# Patient Record
Sex: FEMALE | Race: WHITE | NOT HISPANIC OR LATINO
[De-identification: names, ages, dates, MRNs, and addresses within clinical notes are randomized per-mention and may not be internally consistent; named-entity substitution may affect disease eponyms.]

---

## 2020-01-20 ENCOUNTER — RESULT REVIEW (OUTPATIENT)
Age: 25
End: 2020-01-20

## 2020-04-10 PROBLEM — Z00.00 ENCOUNTER FOR PREVENTIVE HEALTH EXAMINATION: Status: ACTIVE | Noted: 2020-04-10

## 2020-04-13 ENCOUNTER — ASOB RESULT (OUTPATIENT)
Age: 25
End: 2020-04-13

## 2020-04-13 ENCOUNTER — APPOINTMENT (OUTPATIENT)
Dept: ANTEPARTUM | Facility: CLINIC | Age: 25
End: 2020-04-13
Payer: COMMERCIAL

## 2020-04-13 PROCEDURE — 76811 OB US DETAILED SNGL FETUS: CPT

## 2020-08-25 ENCOUNTER — INPATIENT (INPATIENT)
Facility: HOSPITAL | Age: 25
LOS: 2 days | Discharge: HOME | End: 2020-08-28
Attending: OBSTETRICS & GYNECOLOGY | Admitting: OBSTETRICS & GYNECOLOGY
Payer: COMMERCIAL

## 2020-08-25 DIAGNOSIS — O26.899 OTHER SPECIFIED PREGNANCY RELATED CONDITIONS, UNSPECIFIED TRIMESTER: ICD-10-CM

## 2020-08-25 DIAGNOSIS — Z3A.00 WEEKS OF GESTATION OF PREGNANCY NOT SPECIFIED: ICD-10-CM

## 2020-08-26 ENCOUNTER — TRANSCRIPTION ENCOUNTER (OUTPATIENT)
Age: 25
End: 2020-08-26

## 2020-08-26 VITALS — WEIGHT: 166.01 LBS | HEIGHT: 63 IN

## 2020-08-26 DIAGNOSIS — Z34.80 ENCOUNTER FOR SUPERVISION OF OTHER NORMAL PREGNANCY, UNSPECIFIED TRIMESTER: ICD-10-CM

## 2020-08-26 LAB
ALBUMIN SERPL ELPH-MCNC: 3 G/DL — LOW (ref 3.3–5)
ALP SERPL-CCNC: 68 U/L — SIGNIFICANT CHANGE UP (ref 40–120)
ALT FLD-CCNC: 10 U/L — SIGNIFICANT CHANGE UP (ref 10–45)
ANION GAP SERPL CALC-SCNC: 10 MMOL/L — SIGNIFICANT CHANGE UP (ref 5–17)
APTT BLD: 26.9 SEC — LOW (ref 27.5–35.5)
AST SERPL-CCNC: 21 U/L — SIGNIFICANT CHANGE UP (ref 10–40)
BASOPHILS # BLD AUTO: 0 K/UL — SIGNIFICANT CHANGE UP (ref 0–0.2)
BASOPHILS # BLD AUTO: 0.02 K/UL — SIGNIFICANT CHANGE UP (ref 0–0.2)
BASOPHILS # BLD AUTO: 0.03 K/UL — SIGNIFICANT CHANGE UP (ref 0–0.2)
BASOPHILS NFR BLD AUTO: 0 % — SIGNIFICANT CHANGE UP (ref 0–2)
BASOPHILS NFR BLD AUTO: 0.1 % — SIGNIFICANT CHANGE UP (ref 0–2)
BASOPHILS NFR BLD AUTO: 0.3 % — SIGNIFICANT CHANGE UP (ref 0–2)
BILIRUB SERPL-MCNC: 0.3 MG/DL — SIGNIFICANT CHANGE UP (ref 0.2–1.2)
BLD GP AB SCN SERPL QL: NEGATIVE — SIGNIFICANT CHANGE UP
BUN SERPL-MCNC: <4 MG/DL — LOW (ref 7–23)
CALCIUM SERPL-MCNC: 8.7 MG/DL — SIGNIFICANT CHANGE UP (ref 8.4–10.5)
CHLORIDE SERPL-SCNC: 105 MMOL/L — SIGNIFICANT CHANGE UP (ref 96–108)
CO2 SERPL-SCNC: 21 MMOL/L — LOW (ref 22–31)
CREAT SERPL-MCNC: 0.53 MG/DL — SIGNIFICANT CHANGE UP (ref 0.5–1.3)
EOSINOPHIL # BLD AUTO: 0 K/UL — SIGNIFICANT CHANGE UP (ref 0–0.5)
EOSINOPHIL # BLD AUTO: 0.02 K/UL — SIGNIFICANT CHANGE UP (ref 0–0.5)
EOSINOPHIL # BLD AUTO: 0.09 K/UL — SIGNIFICANT CHANGE UP (ref 0–0.5)
EOSINOPHIL NFR BLD AUTO: 0 % — SIGNIFICANT CHANGE UP (ref 0–6)
EOSINOPHIL NFR BLD AUTO: 0.1 % — SIGNIFICANT CHANGE UP (ref 0–6)
EOSINOPHIL NFR BLD AUTO: 0.8 % — SIGNIFICANT CHANGE UP (ref 0–6)
FIBRINOGEN PPP-MCNC: 419 MG/DL — SIGNIFICANT CHANGE UP (ref 350–510)
GLUCOSE SERPL-MCNC: 119 MG/DL — HIGH (ref 70–99)
HCT VFR BLD CALC: 30.4 % — LOW (ref 34.5–45)
HCT VFR BLD CALC: 31.7 % — LOW (ref 34.5–45)
HCT VFR BLD CALC: 40.2 % — SIGNIFICANT CHANGE UP (ref 34.5–45)
HGB BLD-MCNC: 10 G/DL — LOW (ref 11.5–15.5)
HGB BLD-MCNC: 10.1 G/DL — LOW (ref 11.5–15.5)
HGB BLD-MCNC: 13.1 G/DL — SIGNIFICANT CHANGE UP (ref 11.5–15.5)
IMM GRANULOCYTES NFR BLD AUTO: 0.5 % — SIGNIFICANT CHANGE UP (ref 0–1.5)
IMM GRANULOCYTES NFR BLD AUTO: 0.6 % — SIGNIFICANT CHANGE UP (ref 0–1.5)
INR BLD: 0.95 RATIO — SIGNIFICANT CHANGE UP (ref 0.88–1.16)
LACTATE SERPL-SCNC: 1.5 MMOL/L — SIGNIFICANT CHANGE UP (ref 0.7–2)
LACTATE SERPL-SCNC: 2.9 MMOL/L — HIGH (ref 0.7–2)
LACTATE SERPL-SCNC: 3 MMOL/L — HIGH (ref 0.7–2)
LDH SERPL L TO P-CCNC: 156 U/L — SIGNIFICANT CHANGE UP (ref 50–242)
LYMPHOCYTES # BLD AUTO: 1.06 K/UL — SIGNIFICANT CHANGE UP (ref 1–3.3)
LYMPHOCYTES # BLD AUTO: 1.91 K/UL — SIGNIFICANT CHANGE UP (ref 1–3.3)
LYMPHOCYTES # BLD AUTO: 11.3 % — LOW (ref 13–44)
LYMPHOCYTES # BLD AUTO: 28.7 % — SIGNIFICANT CHANGE UP (ref 13–44)
LYMPHOCYTES # BLD AUTO: 3.21 K/UL — SIGNIFICANT CHANGE UP (ref 1–3.3)
LYMPHOCYTES # BLD AUTO: 6.1 % — LOW (ref 13–44)
MCHC RBC-ENTMCNC: 29.6 PG — SIGNIFICANT CHANGE UP (ref 27–34)
MCHC RBC-ENTMCNC: 29.6 PG — SIGNIFICANT CHANGE UP (ref 27–34)
MCHC RBC-ENTMCNC: 29.9 PG — SIGNIFICANT CHANGE UP (ref 27–34)
MCHC RBC-ENTMCNC: 31.9 GM/DL — LOW (ref 32–36)
MCHC RBC-ENTMCNC: 32.6 GM/DL — SIGNIFICANT CHANGE UP (ref 32–36)
MCHC RBC-ENTMCNC: 32.9 GM/DL — SIGNIFICANT CHANGE UP (ref 32–36)
MCV RBC AUTO: 90.7 FL — SIGNIFICANT CHANGE UP (ref 80–100)
MCV RBC AUTO: 91 FL — SIGNIFICANT CHANGE UP (ref 80–100)
MCV RBC AUTO: 93 FL — SIGNIFICANT CHANGE UP (ref 80–100)
MONOCYTES # BLD AUTO: 0.45 K/UL — SIGNIFICANT CHANGE UP (ref 0–0.9)
MONOCYTES # BLD AUTO: 0.63 K/UL — SIGNIFICANT CHANGE UP (ref 0–0.9)
MONOCYTES # BLD AUTO: 0.64 K/UL — SIGNIFICANT CHANGE UP (ref 0–0.9)
MONOCYTES NFR BLD AUTO: 2.6 % — SIGNIFICANT CHANGE UP (ref 2–14)
MONOCYTES NFR BLD AUTO: 3.8 % — SIGNIFICANT CHANGE UP (ref 2–14)
MONOCYTES NFR BLD AUTO: 5.6 % — SIGNIFICANT CHANGE UP (ref 2–14)
NEUTROPHILS # BLD AUTO: 14.16 K/UL — HIGH (ref 1.8–7.4)
NEUTROPHILS # BLD AUTO: 15.8 K/UL — HIGH (ref 1.8–7.4)
NEUTROPHILS # BLD AUTO: 7.16 K/UL — SIGNIFICANT CHANGE UP (ref 1.8–7.4)
NEUTROPHILS NFR BLD AUTO: 64 % — SIGNIFICANT CHANGE UP (ref 43–77)
NEUTROPHILS NFR BLD AUTO: 84.2 % — HIGH (ref 43–77)
NEUTROPHILS NFR BLD AUTO: 90.4 % — HIGH (ref 43–77)
NRBC # BLD: 0 /100 WBCS — SIGNIFICANT CHANGE UP (ref 0–0)
NRBC # BLD: 0 /100 WBCS — SIGNIFICANT CHANGE UP (ref 0–0)
PLATELET # BLD AUTO: 183 K/UL — SIGNIFICANT CHANGE UP (ref 150–400)
PLATELET # BLD AUTO: 188 K/UL — SIGNIFICANT CHANGE UP (ref 150–400)
PLATELET # BLD AUTO: 242 K/UL — SIGNIFICANT CHANGE UP (ref 150–400)
POTASSIUM SERPL-MCNC: 3.5 MMOL/L — SIGNIFICANT CHANGE UP (ref 3.5–5.3)
POTASSIUM SERPL-SCNC: 3.5 MMOL/L — SIGNIFICANT CHANGE UP (ref 3.5–5.3)
PROT SERPL-MCNC: 5 G/DL — LOW (ref 6–8.3)
PROTHROM AB SERPL-ACNC: 11.3 SEC — SIGNIFICANT CHANGE UP (ref 10.6–13.6)
RBC # BLD: 3.34 M/UL — LOW (ref 3.8–5.2)
RBC # BLD: 3.41 M/UL — LOW (ref 3.8–5.2)
RBC # BLD: 4.43 M/UL — SIGNIFICANT CHANGE UP (ref 3.8–5.2)
RBC # FLD: 13.1 % — SIGNIFICANT CHANGE UP (ref 10.3–14.5)
RBC # FLD: 13.2 % — SIGNIFICANT CHANGE UP (ref 10.3–14.5)
RBC # FLD: 13.3 % — SIGNIFICANT CHANGE UP (ref 10.3–14.5)
RH IG SCN BLD-IMP: POSITIVE — SIGNIFICANT CHANGE UP
RH IG SCN BLD-IMP: POSITIVE — SIGNIFICANT CHANGE UP
SARS-COV-2 IGG SERPL QL IA: POSITIVE
SARS-COV-2 IGM SERPL IA-ACNC: 10.2 INDEX — HIGH
SARS-COV-2 RNA SPEC QL NAA+PROBE: SIGNIFICANT CHANGE UP
SODIUM SERPL-SCNC: 136 MMOL/L — SIGNIFICANT CHANGE UP (ref 135–145)
T PALLIDUM AB TITR SER: NEGATIVE — SIGNIFICANT CHANGE UP
URATE SERPL-MCNC: 2.8 MG/DL — SIGNIFICANT CHANGE UP (ref 2.5–7)
WBC # BLD: 11.19 K/UL — HIGH (ref 3.8–10.5)
WBC # BLD: 16.84 K/UL — HIGH (ref 3.8–10.5)
WBC # BLD: 17.31 K/UL — HIGH (ref 3.8–10.5)
WBC # FLD AUTO: 11.19 K/UL — HIGH (ref 3.8–10.5)
WBC # FLD AUTO: 16.84 K/UL — HIGH (ref 3.8–10.5)
WBC # FLD AUTO: 17.31 K/UL — HIGH (ref 3.8–10.5)

## 2020-08-26 PROCEDURE — 88307 TISSUE EXAM BY PATHOLOGIST: CPT | Mod: 26

## 2020-08-26 RX ORDER — SODIUM CHLORIDE 9 MG/ML
3 INJECTION INTRAMUSCULAR; INTRAVENOUS; SUBCUTANEOUS EVERY 8 HOURS
Refills: 0 | Status: DISCONTINUED | OUTPATIENT
Start: 2020-08-26 | End: 2020-08-28

## 2020-08-26 RX ORDER — PRAMOXINE HYDROCHLORIDE 150 MG/15G
1 AEROSOL, FOAM RECTAL EVERY 4 HOURS
Refills: 0 | Status: DISCONTINUED | OUTPATIENT
Start: 2020-08-26 | End: 2020-08-28

## 2020-08-26 RX ORDER — MAGNESIUM HYDROXIDE 400 MG/1
30 TABLET, CHEWABLE ORAL
Refills: 0 | Status: DISCONTINUED | OUTPATIENT
Start: 2020-08-26 | End: 2020-08-28

## 2020-08-26 RX ORDER — SIMETHICONE 80 MG/1
80 TABLET, CHEWABLE ORAL EVERY 4 HOURS
Refills: 0 | Status: DISCONTINUED | OUTPATIENT
Start: 2020-08-26 | End: 2020-08-28

## 2020-08-26 RX ORDER — OXYTOCIN 10 UNIT/ML
4 VIAL (ML) INJECTION
Qty: 30 | Refills: 0 | Status: DISCONTINUED | OUTPATIENT
Start: 2020-08-26 | End: 2020-08-28

## 2020-08-26 RX ORDER — OXYCODONE HYDROCHLORIDE 5 MG/1
5 TABLET ORAL ONCE
Refills: 0 | Status: DISCONTINUED | OUTPATIENT
Start: 2020-08-26 | End: 2020-08-28

## 2020-08-26 RX ORDER — IBUPROFEN 200 MG
600 TABLET ORAL EVERY 6 HOURS
Refills: 0 | Status: DISCONTINUED | OUTPATIENT
Start: 2020-08-26 | End: 2020-08-28

## 2020-08-26 RX ORDER — LANOLIN
1 OINTMENT (GRAM) TOPICAL EVERY 6 HOURS
Refills: 0 | Status: DISCONTINUED | OUTPATIENT
Start: 2020-08-26 | End: 2020-08-28

## 2020-08-26 RX ORDER — OXYCODONE HYDROCHLORIDE 5 MG/1
5 TABLET ORAL
Refills: 0 | Status: DISCONTINUED | OUTPATIENT
Start: 2020-08-26 | End: 2020-08-28

## 2020-08-26 RX ORDER — PIPERACILLIN AND TAZOBACTAM 4; .5 G/20ML; G/20ML
3.38 INJECTION, POWDER, LYOPHILIZED, FOR SOLUTION INTRAVENOUS EVERY 6 HOURS
Refills: 0 | Status: DISCONTINUED | OUTPATIENT
Start: 2020-08-26 | End: 2020-08-27

## 2020-08-26 RX ORDER — PIPERACILLIN AND TAZOBACTAM 4; .5 G/20ML; G/20ML
3.38 INJECTION, POWDER, LYOPHILIZED, FOR SOLUTION INTRAVENOUS ONCE
Refills: 0 | Status: COMPLETED | OUTPATIENT
Start: 2020-08-26 | End: 2020-08-26

## 2020-08-26 RX ORDER — CITRIC ACID/SODIUM CITRATE 300-500 MG
15 SOLUTION, ORAL ORAL EVERY 6 HOURS
Refills: 0 | Status: DISCONTINUED | OUTPATIENT
Start: 2020-08-26 | End: 2020-08-26

## 2020-08-26 RX ORDER — ACETAMINOPHEN 500 MG
975 TABLET ORAL
Refills: 0 | Status: DISCONTINUED | OUTPATIENT
Start: 2020-08-26 | End: 2020-08-28

## 2020-08-26 RX ORDER — DIBUCAINE 1 %
1 OINTMENT (GRAM) RECTAL EVERY 6 HOURS
Refills: 0 | Status: DISCONTINUED | OUTPATIENT
Start: 2020-08-26 | End: 2020-08-28

## 2020-08-26 RX ORDER — IBUPROFEN 200 MG
600 TABLET ORAL EVERY 6 HOURS
Refills: 0 | Status: COMPLETED | OUTPATIENT
Start: 2020-08-26 | End: 2021-07-25

## 2020-08-26 RX ORDER — HYDROCORTISONE 1 %
1 OINTMENT (GRAM) TOPICAL EVERY 6 HOURS
Refills: 0 | Status: DISCONTINUED | OUTPATIENT
Start: 2020-08-26 | End: 2020-08-28

## 2020-08-26 RX ORDER — OXYTOCIN 10 UNIT/ML
4 VIAL (ML) INJECTION
Qty: 30 | Refills: 0 | Status: DISCONTINUED | OUTPATIENT
Start: 2020-08-26 | End: 2020-08-26

## 2020-08-26 RX ORDER — AER TRAVELER 0.5 G/1
1 SOLUTION RECTAL; TOPICAL EVERY 4 HOURS
Refills: 0 | Status: DISCONTINUED | OUTPATIENT
Start: 2020-08-26 | End: 2020-08-28

## 2020-08-26 RX ORDER — BENZOCAINE 10 %
1 GEL (GRAM) MUCOUS MEMBRANE EVERY 6 HOURS
Refills: 0 | Status: DISCONTINUED | OUTPATIENT
Start: 2020-08-26 | End: 2020-08-28

## 2020-08-26 RX ORDER — SODIUM CHLORIDE 9 MG/ML
1000 INJECTION, SOLUTION INTRAVENOUS
Refills: 0 | Status: DISCONTINUED | OUTPATIENT
Start: 2020-08-26 | End: 2020-08-28

## 2020-08-26 RX ORDER — IBUPROFEN 200 MG
1 TABLET ORAL
Qty: 0 | Refills: 0 | DISCHARGE
Start: 2020-08-26

## 2020-08-26 RX ORDER — SODIUM CHLORIDE 9 MG/ML
1000 INJECTION, SOLUTION INTRAVENOUS
Refills: 0 | Status: DISCONTINUED | OUTPATIENT
Start: 2020-08-26 | End: 2020-08-26

## 2020-08-26 RX ORDER — OXYTOCIN 10 UNIT/ML
333.33 VIAL (ML) INJECTION
Qty: 20 | Refills: 0 | Status: DISCONTINUED | OUTPATIENT
Start: 2020-08-26 | End: 2020-08-28

## 2020-08-26 RX ORDER — TETANUS TOXOID, REDUCED DIPHTHERIA TOXOID AND ACELLULAR PERTUSSIS VACCINE, ADSORBED 5; 2.5; 8; 8; 2.5 [IU]/.5ML; [IU]/.5ML; UG/.5ML; UG/.5ML; UG/.5ML
0.5 SUSPENSION INTRAMUSCULAR ONCE
Refills: 0 | Status: DISCONTINUED | OUTPATIENT
Start: 2020-08-26 | End: 2020-08-28

## 2020-08-26 RX ORDER — DIPHENHYDRAMINE HCL 50 MG
25 CAPSULE ORAL EVERY 6 HOURS
Refills: 0 | Status: DISCONTINUED | OUTPATIENT
Start: 2020-08-26 | End: 2020-08-28

## 2020-08-26 RX ORDER — SODIUM CHLORIDE 9 MG/ML
1000 INJECTION, SOLUTION INTRAVENOUS ONCE
Refills: 0 | Status: COMPLETED | OUTPATIENT
Start: 2020-08-26 | End: 2020-08-26

## 2020-08-26 RX ORDER — KETOROLAC TROMETHAMINE 30 MG/ML
30 SYRINGE (ML) INJECTION ONCE
Refills: 0 | Status: DISCONTINUED | OUTPATIENT
Start: 2020-08-26 | End: 2020-08-26

## 2020-08-26 RX ORDER — ACETAMINOPHEN 500 MG
3 TABLET ORAL
Qty: 0 | Refills: 0 | DISCHARGE
Start: 2020-08-26

## 2020-08-26 RX ADMIN — Medication 975 MILLIGRAM(S): at 16:15

## 2020-08-26 RX ADMIN — Medication 600 MILLIGRAM(S): at 20:37

## 2020-08-26 RX ADMIN — PIPERACILLIN AND TAZOBACTAM 200 GRAM(S): 4; .5 INJECTION, POWDER, LYOPHILIZED, FOR SOLUTION INTRAVENOUS at 14:27

## 2020-08-26 RX ADMIN — Medication 975 MILLIGRAM(S): at 15:36

## 2020-08-26 RX ADMIN — SODIUM CHLORIDE 1000 MILLILITER(S): 9 INJECTION, SOLUTION INTRAVENOUS at 12:54

## 2020-08-26 RX ADMIN — PIPERACILLIN AND TAZOBACTAM 25 GRAM(S): 4; .5 INJECTION, POWDER, LYOPHILIZED, FOR SOLUTION INTRAVENOUS at 20:00

## 2020-08-26 RX ADMIN — Medication 30 MILLIGRAM(S): at 11:24

## 2020-08-26 RX ADMIN — Medication 30 MILLIGRAM(S): at 13:36

## 2020-08-26 RX ADMIN — Medication 600 MILLIGRAM(S): at 21:07

## 2020-08-26 RX ADMIN — Medication 4 MILLIUNIT(S)/MIN: at 03:06

## 2020-08-26 NOTE — DISCHARGE NOTE OB - PATIENT PORTAL LINK FT
You can access the FollowMyHealth Patient Portal offered by [facility] by registering at the following website: [email or web address]. By joining [employer]’s FollowMyHealth portal, you will also be able to view your health information using other applications (apps) compatible with our system.

## 2020-08-26 NOTE — PRE-ANESTHESIA EVALUATION ADULT - NSANTHOSAYNRD_GEN_A_CORE
No. TAI screening performed.  STOP BANG Legend: 0-2 = LOW Risk; 3-4 = INTERMEDIATE Risk; 5-8 = HIGH Risk

## 2020-08-26 NOTE — DISCHARGE NOTE OB - CARE PROVIDER_API CALL
Amina Hi  OBSTETRICS AND GYNECOLOGY  [address], [address]  [place] [address]  Phone: [phone]  Fax: [phone]  Follow Up Time:

## 2020-08-26 NOTE — CHART NOTE - NSCHARTNOTEFT_GEN_A_CORE
[facility] Event Note     S: Patient seen and evaluated at the bedside for tachycardia to 120s-130s. Patient complains of feeling warm and sweaty. Pain is well controlled. Tolerating regular diet.   Patient unable to void, was straight cath'ed for urinary retention with 1L outptut.      Is passing flatus.    Denies chest pain, cough, shortness of breath, nausea, vomiting.  Denies back pain, dysuria, hematuria, flank pain and urinary frequency.   Denies lower extremity pain and swelling.     Labor course: patient came in overnight at 4cm dilated, had epidural and uncomplicated vaginal delivery with . Patient afebrile throughout with labile blood pressures. Fetal tachycardia noted.    O:    T(C): 36.9 (20 @ 09:10), Max: 36.9 (20 @ 09:10)  HR: 128 (20 @ 11:10) (116 - 136)  BP: 117/62 (20 @ 11:10) (102/55 - 117/62)  RR: 18 (20 @ 11:10) (18 - 20)  SpO2: 96% (20 @ 11:10) (96% - 100%)      20 @ 07:01  -  20 @ 07:00  --------------------------------------------------------  IN: 3000 mL / OUT: 700 mL / NET: 2300 mL    20 @ 07:01  -  20 @ 12:38  --------------------------------------------------------  IN: 3000 mL / OUT: 1850 mL / NET: 1150 mL        Gen: NAD, AAOx3  CV: RRR, no murmurs  Pulm: CTABL  Back: no CVAT  Abd: soft, ND, NT/appropriate post op tenderness, incision clean, dry and intact, no rebound tenderness or guarding  Extr: SCDs in place, NTBL, no edema or erythema    Medications:  acetaminophen   Tablet .. 975 milliGRAM(s) Oral <User Schedule>  benzocaine 20%/menthol 0.5% Spray 1 Spray(s) Topical every 6 hours PRN  citric acid/sodium citrate Solution 15 milliLiter(s) Oral every 6 hours  dextrose 5% + lactated ringers. 1000 milliLiter(s) IV Continuous <Continuous>  dibucaine 1% Ointment 1 Application(s) Topical every 6 hours PRN  diphenhydrAMINE 25 milliGRAM(s) Oral every 6 hours PRN  diphtheria/tetanus/pertussis (acellular) Vaccine (ADAcel) 0.5 milliLiter(s) IntraMuscular once  hydrocortisone 1% Cream 1 Application(s) Topical every 6 hours PRN  ibuprofen  Tablet. 600 milliGRAM(s) Oral every 6 hours  lactated ringers Bolus 1000 milliLiter(s) IV Bolus once  lactated ringers. 1000 milliLiter(s) IV Continuous <Continuous>  lanolin Ointment 1 Application(s) Topical every 6 hours PRN  magnesium hydroxide Suspension 30 milliLiter(s) Oral two times a day PRN  oxyCODONE    IR 5 milliGRAM(s) Oral every 3 hours PRN  oxyCODONE    IR 5 milliGRAM(s) Oral once PRN  oxytocin Infusion 333.333 milliUNIT(s)/Min IV Continuous <Continuous>  oxytocin Infusion 333.333 milliUNIT(s)/Min IV Continuous <Continuous>  oxytocin Infusion 4 milliUNIT(s)/Min IV Continuous <Continuous>  oxytocin Infusion 4 milliUNIT(s)/Min IV Continuous <Continuous>  pramoxine 1%/zinc 5% Cream 1 Application(s) Topical every 4 hours PRN  prenatal multivitamin 1 Tablet(s) Oral daily  simethicone 80 milliGRAM(s) Chew every 4 hours PRN  sodium chloride 0.9% lock flush 3 milliLiter(s) IV Push every 8 hours  witch hazel Pads 1 Application(s) Topical every 4 hours PRN      A/P: 24y PPD#0, status post  with post op tachycardia. Differential diagnosis includes atelectatis, pneumonia, UTI,  endometritis, wound infection, DVT, PE, drugs, septic pelvic thrombophlebitis. Hignest on the differential is fluid underresuscitaiton v infection.  -CBC, lactate, CMP, LDH, uric acid, coags  -Urine culture, blood culture x 2 for sepsis workup (fetal and maternal tachycardia)  -continue with IVF--1L bolus ordered  -strict Is and Os    Keyona Chilel MD PGY3  Pager #[phone]  d/w Dr. Hi

## 2020-08-26 NOTE — DISCHARGE NOTE OB - MATERIALS PROVIDED
Breastfeeding Guide and Packet/Discharge Medication Information for Patients and Families Pocket Guide/[place] Hearing Screen Program/Shaken Baby Prevention Handout/Breastfeeding Log/Back To Sleep Handout/Birth Certificate Instructions/Bottle Feeding Log/Breastfeeding Mother’s Support Group Information/Guide to Postpartum Care

## 2020-08-26 NOTE — CHART NOTE - NSCHARTNOTEFT_GEN_A_CORE
PA Chart Note:    Patient is a  24y  PPD#0 s/p  with an EBL of 400cc.  Notified by RN that patient has been tachycardic while in PACU. Her pulse was 136 and then trended down to 110's.   Patient seen and evaluated at bedside. Patient is w/o complaints. She denies CP, palpitations, SOB. Pain is controlled. Lochia WNL.   To note, patient was tachycardic on arrival to L&D and during labor up to 150.    O:  Vital Signs Last 24 Hrs  T(C): 36.9 (26 Aug 2020 09:10), Max: 36.9 (26 Aug 2020 09:10)  T(F): 98.4 (26 Aug 2020 09:10), Max: 98.4 (26 Aug 2020 09:10)  HR: 116 (26 Aug 2020 10:10) (116 - 136)  BP: 109/74 (26 Aug 2020 10:10) (102/55 - 110/56)  BP(mean): --  RR: 18 (26 Aug 2020 10:10) (18 - 20)  SpO2: 98% (26 Aug 2020 10:10) (98% - 100%)     Gen: NAD  CV: RR, Mildly tachycardic  Abdomen: Soft, nontender, non-distended, fundus firm  Lochia WNL  Ext: Neg edema, Neg calf tenderness    A/P:  24y PPD#0 s/p  with tachycardia. Patient is afebrile, pain is controlled and bleeding WNL.     -Continue to monitor VS  -Continue routine post partum pain mgmt  -For re-eval in 1 hour    D/w Dr. Kolton Hart PA-C

## 2020-08-26 NOTE — PATIENT PROFILE OB - AMNIOTIC FLUID ODOR, LABOR
If you have an active [facility] account, please look for your well child questionnaire to come to your [facility] account before your next well child visit.    [facility]: [phone]    Well-Child Checkup: 6 to 10 Years     Struggles in school can indicate problems with a childs health or development. If your child is having trouble in school, talk to the childs doctor.     Even if your child is healthy, keep bringing him or her in for yearly checkups. These visits ensure your childs health is protected with scheduled vaccinations and health screenings. Your child's healthcare provider will also check his or her growth and development. This sheet describes some of what you can expect.  School and social issues  Here are some topics you, your child, and the healthcare provider may want to discuss during this visit:  · Reading. Does your child like to read? Is the child reading at the right level for his or her age group?   · Friendships. Does your child have friends at school? How do they get along? Do you like your childs friends? Do you have any concerns about your childs friendships or problems that may be happening with other children (such as bullying)?  · Activities. What does your child like to do for fun? Is he or she involved in after-school activities such as sports, scouting, or music classes?   · Family interaction. How are things at home? Does your child have good relationships with others in the family? Does he or she talk to you about problems? How is the childs behavior at home?   · Behavior and participation at school. How does your child act at school? Does the child follow the classroom routine and take part in group activities? What do teachers say about the childs behavior? Is homework finished on time? Do you or other family members help with homework?  · Household chores. Does your child help around the house with chores such as taking out the trash or setting the  table?  Nutrition and exercise tips  Teaching your child healthy eating and lifestyle habits can lead to a lifetime of good health. To help, set a good example with your words and actions. Remember, good habits formed now will stay with your child forever. Here are some tips:  · Help your child get at least 30 minutes to 60 minutes of active play per day. Moving around helps keep your child healthy. Go to the park, ride bikes, or play active games like tag or ball.  · Limit screen time to  a maximum of 1 hour to 2 hours each day. This includes time spent watching TV, playing video games, using the computer, and texting. If your child has a TV, computer, or video game console in the bedroom,  replace it with a music player. For many kids, dancing and singing are fun ways to get moving.  · Limit sugary drinks. Soda, juice, and sports drinks lead to unhealthy weight gain and tooth decay. Water and low-fat or nonfat milk are best to drink. In moderation (a small glass no more than once a day), 100% fruit juice is OK. Save soda and other sugary drinks for special occasions.   · Serve nutritious foods. Keep a variety of healthy foods on hand for snacks, including fresh fruits and vegetables, lean meats, and whole grains. Foods like French fries, candy, and snack foods should only be served rarely.   · Serve child-sized portions. Children dont need as much food as adults. Serve your child portions that make sense for his or her age and size. Let your child stop eating when he or she is full. If your child is still hungry after a meal, offer more vegetables or fruit.  · Ask the healthcare provider about your childs weight. Your child should gain about 4 pounds to 5 pounds each year. If your child is gaining more than that, talk to the health care provider about healthy eating habits and exercise guidelines.  · Bring your child to the dentist at least twice a year for teeth cleaning and a checkup.  Sleeping tips  Now  that your child is in school, a good nights sleep is even more important. At this age, your child needs about 10 hours of sleep each night. Here are some tips:  · Set a bedtime and make sure your child follows it each night.  · TV, computer, and video games can agitate a child and make it hard to calm down for the night. Turn them off at least an hour before bed. Instead, read a chapter of a book together.  · Remind your child to brush and floss his or her teeth before bed. Directly supervise your child's dental self-care to ensure that both the back teeth and the front teeth are cleaned.  Safety tips  · When riding a bike, your child should wear a helmet with the strap fastened. While roller-skating, roller-blading, or using a scooter or skateboard, its safest to wear wrist guards, elbow pads, and knee pads, as well as a helmet.  · In the car, continue to use a booster seat until your child is taller than 4 feet 9 inches. At this height, kids are able to sit with the seat belt fitting correctly over the collarbone and hips. Ask the healthcare provider if you have questions about when your child will be ready to stop using a booster seat. All children younger than 13 should sit in the back seat.  · Teach your child not to talk to strangers or go anywhere with a stranger.  · Teach your child to swim. Many communities offer low-cost swimming lessons. Do not let your child play in or around a pool unattended, even if he or she knows how to swim.  Vaccinations  Based on recommendations from the CDC, at this visit your child may receive the following vaccinations:  · Diphtheria, tetanus, and pertussis (age 6 only)  · Human papillomavirus (HPV) (ages 9 and up)  · Influenza (flu), annually  · Measles, mumps, and rubella  · Polio  · Varicella (chickenpox)  Bedwetting: Its not your childs fault  Bedwetting, or urinating when sleeping, can be frustrating for both you and your child. But its usually not a sign of a major  problem. Your childs body may simply need more time to mature. If a child suddenly starts wetting the bed, the cause is often a lifestyle change (such as starting school) or a stressful event (such as the birth of a sibling). But whatever the cause, its not in your childs direct control. If your child wets the bed:  · Keep in mind that your child is not wetting on purpose. Never punish or tease a child for wetting the bed. Punishment or shaming may make the problem worse, not better.  · To help your child, be positive and supportive. Praise your child for not wetting and even for trying hard to stay dry.  · Two hours before bedtime, dont serve your child anything to drink.  · Remind your child to use the toilet before bed. You could also wake him or her to use the bathroom before you go to bed yourself.  · Have a routine for changing sheets and pajamas when the child wets. Try to make this routine as calm and orderly as possible. This will help keep both you and your child from getting too upset or frustrated to go back to sleep.  · Put up a calendar or chart and give your child a star or sticker for nights that he or she doesnt wet the bed.  · Encourage your child to get out of bed and try to use the toilet if he or she wakes during the night. Put night-lights in the bedroom, hallway, and bathroom to help your child feel safer walking to the bathroom.  · If you have concerns about bedwetting, discuss them with the health care provider.       Next checkup at: _______________________________     PARENT NOTES:  Date Last Reviewed: 10/2/2014  © 1874-5816 [employer]. [address] [place] [address], [place] [address]. All rights reserved. This information is not intended as a substitute for professional medical care. Always follow your healthcare professional's instructions.      Mental Health Resources    Kid Catch Directory: www.kidcatchdirectory.org   This website contains mental health resources for  children and adolescents in the [facility].  Mental health providers are searchable by issue and insurance.    [facility] Providers    [facility] - outpatient counseling, psychiatry, educational services  [email or web address]://[email or web address]    [address] [place] [address]  [phone]    [address] [place] [address]  [phone]    [address] [place] [address]  [phone]    [facility] - psychiatry, therapy, counseling  [email or web address]    [address]  [place] [address]  [phone]    [address] [place] [address]  [phone]    [facility] - psychiatry, therapy, counseling  http://[email or web address].Perficient    [address], [address]  [place], [place] [address] [phone]    [facility] - therapy, counseling, evaluations (psychiatric, psychoeducational, school entrance)  [email or web address]    [address] [place] [address]  [phone]    [facility] - mental health, counseling    [address], [address]  [place] [address]  [phone]    [address] [place] [address]  [phone]    [facility] - psychiatry, therapy, counseling  [email or web address]    [address]  [place], [place] [address]  [phone]    [address]  [place], [place] [address]   [phone]    [facility] - ADHD, anxiety, depression, PTSD  [email or web address]  [address] [place] [address]  [phone]    [facility] - individual, group, family, and play therapy  [email or web address]    [address] [place] [address], [address]  [place] [address]  [phone]    [facility]  - evaluations: ADHD, developmental, psychoeducational, and neuropsychological  [email or web address]    [address], [place] [address]  [place]  [address]   [phone]   normal

## 2020-08-26 NOTE — DISCHARGE NOTE OB - CARE PLAN
Principal Discharge DX:	 (normal spontaneous vaginal delivery)  Goal:	recovery  Assessment and plan of treatment:	return to baseline health, regular diet, pain control, follow up with Dr Hi

## 2020-08-27 ENCOUNTER — APPOINTMENT (OUTPATIENT)
Dept: ANTEPARTUM | Facility: CLINIC | Age: 25
End: 2020-08-27

## 2020-08-27 LAB
BASOPHILS # BLD AUTO: 0.03 K/UL — SIGNIFICANT CHANGE UP (ref 0–0.2)
BASOPHILS NFR BLD AUTO: 0.2 % — SIGNIFICANT CHANGE UP (ref 0–2)
CULTURE RESULTS: SIGNIFICANT CHANGE UP
EOSINOPHIL # BLD AUTO: 0.08 K/UL — SIGNIFICANT CHANGE UP (ref 0–0.5)
EOSINOPHIL NFR BLD AUTO: 0.6 % — SIGNIFICANT CHANGE UP (ref 0–6)
HCT VFR BLD CALC: 28.5 % — LOW (ref 34.5–45)
HGB BLD-MCNC: 9.1 G/DL — LOW (ref 11.5–15.5)
IMM GRANULOCYTES NFR BLD AUTO: 0.3 % — SIGNIFICANT CHANGE UP (ref 0–1.5)
LYMPHOCYTES # BLD AUTO: 18.6 % — SIGNIFICANT CHANGE UP (ref 13–44)
LYMPHOCYTES # BLD AUTO: 2.49 K/UL — SIGNIFICANT CHANGE UP (ref 1–3.3)
MCHC RBC-ENTMCNC: 29.6 PG — SIGNIFICANT CHANGE UP (ref 27–34)
MCHC RBC-ENTMCNC: 31.9 GM/DL — LOW (ref 32–36)
MCV RBC AUTO: 92.8 FL — SIGNIFICANT CHANGE UP (ref 80–100)
MONOCYTES # BLD AUTO: 0.71 K/UL — SIGNIFICANT CHANGE UP (ref 0–0.9)
MONOCYTES NFR BLD AUTO: 5.3 % — SIGNIFICANT CHANGE UP (ref 2–14)
NEUTROPHILS # BLD AUTO: 10.07 K/UL — HIGH (ref 1.8–7.4)
NEUTROPHILS NFR BLD AUTO: 75 % — SIGNIFICANT CHANGE UP (ref 43–77)
NRBC # BLD: 0 /100 WBCS — SIGNIFICANT CHANGE UP (ref 0–0)
PLATELET # BLD AUTO: 179 K/UL — SIGNIFICANT CHANGE UP (ref 150–400)
RBC # BLD: 3.07 M/UL — LOW (ref 3.8–5.2)
RBC # FLD: 13.3 % — SIGNIFICANT CHANGE UP (ref 10.3–14.5)
SPECIMEN SOURCE: SIGNIFICANT CHANGE UP
WBC # BLD: 13.42 K/UL — HIGH (ref 3.8–10.5)
WBC # FLD AUTO: 13.42 K/UL — HIGH (ref 3.8–10.5)

## 2020-08-27 RX ORDER — PIPERACILLIN AND TAZOBACTAM 4; .5 G/20ML; G/20ML
3.38 INJECTION, POWDER, LYOPHILIZED, FOR SOLUTION INTRAVENOUS EVERY 8 HOURS
Refills: 0 | Status: DISCONTINUED | OUTPATIENT
Start: 2020-08-27 | End: 2020-08-28

## 2020-08-27 RX ADMIN — PIPERACILLIN AND TAZOBACTAM 25 GRAM(S): 4; .5 INJECTION, POWDER, LYOPHILIZED, FOR SOLUTION INTRAVENOUS at 03:27

## 2020-08-27 RX ADMIN — SODIUM CHLORIDE 3 MILLILITER(S): 9 INJECTION INTRAMUSCULAR; INTRAVENOUS; SUBCUTANEOUS at 21:26

## 2020-08-27 RX ADMIN — Medication 600 MILLIGRAM(S): at 09:44

## 2020-08-27 RX ADMIN — Medication 600 MILLIGRAM(S): at 20:38

## 2020-08-27 RX ADMIN — Medication 600 MILLIGRAM(S): at 15:10

## 2020-08-27 RX ADMIN — Medication 975 MILLIGRAM(S): at 04:45

## 2020-08-27 RX ADMIN — Medication 600 MILLIGRAM(S): at 21:25

## 2020-08-27 RX ADMIN — PIPERACILLIN AND TAZOBACTAM 25 GRAM(S): 4; .5 INJECTION, POWDER, LYOPHILIZED, FOR SOLUTION INTRAVENOUS at 17:20

## 2020-08-27 RX ADMIN — Medication 975 MILLIGRAM(S): at 05:15

## 2020-08-27 RX ADMIN — Medication 600 MILLIGRAM(S): at 03:47

## 2020-08-27 RX ADMIN — SODIUM CHLORIDE 3 MILLILITER(S): 9 INJECTION INTRAMUSCULAR; INTRAVENOUS; SUBCUTANEOUS at 14:11

## 2020-08-27 RX ADMIN — Medication 975 MILLIGRAM(S): at 13:05

## 2020-08-27 RX ADMIN — Medication 975 MILLIGRAM(S): at 18:05

## 2020-08-27 RX ADMIN — Medication 975 MILLIGRAM(S): at 18:40

## 2020-08-27 RX ADMIN — Medication 975 MILLIGRAM(S): at 12:31

## 2020-08-27 RX ADMIN — Medication 600 MILLIGRAM(S): at 10:20

## 2020-08-27 RX ADMIN — Medication 600 MILLIGRAM(S): at 15:40

## 2020-08-27 RX ADMIN — PIPERACILLIN AND TAZOBACTAM 25 GRAM(S): 4; .5 INJECTION, POWDER, LYOPHILIZED, FOR SOLUTION INTRAVENOUS at 09:44

## 2020-08-27 RX ADMIN — Medication 600 MILLIGRAM(S): at 03:17

## 2020-08-27 RX ADMIN — Medication 975 MILLIGRAM(S): at 23:39

## 2020-08-28 VITALS
OXYGEN SATURATION: 98 % | SYSTOLIC BLOOD PRESSURE: 111 MMHG | DIASTOLIC BLOOD PRESSURE: 78 MMHG | HEART RATE: 67 BPM | TEMPERATURE: 98 F | RESPIRATION RATE: 18 BRPM

## 2020-08-28 PROCEDURE — 87040 BLOOD CULTURE FOR BACTERIA: CPT

## 2020-08-28 PROCEDURE — 83615 LACTATE (LD) (LDH) ENZYME: CPT

## 2020-08-28 PROCEDURE — 88307 TISSUE EXAM BY PATHOLOGIST: CPT

## 2020-08-28 PROCEDURE — 59025 FETAL NON-STRESS TEST: CPT

## 2020-08-28 PROCEDURE — 86769 SARS-COV-2 COVID-19 ANTIBODY: CPT

## 2020-08-28 PROCEDURE — 84550 ASSAY OF BLOOD/URIC ACID: CPT

## 2020-08-28 PROCEDURE — 86780 TREPONEMA PALLIDUM: CPT

## 2020-08-28 PROCEDURE — 85384 FIBRINOGEN ACTIVITY: CPT

## 2020-08-28 PROCEDURE — 86900 BLOOD TYPING SEROLOGIC ABO: CPT

## 2020-08-28 PROCEDURE — 59050 FETAL MONITOR W/REPORT: CPT

## 2020-08-28 PROCEDURE — 83605 ASSAY OF LACTIC ACID: CPT

## 2020-08-28 PROCEDURE — 85610 PROTHROMBIN TIME: CPT

## 2020-08-28 PROCEDURE — 85730 THROMBOPLASTIN TIME PARTIAL: CPT

## 2020-08-28 PROCEDURE — 86901 BLOOD TYPING SEROLOGIC RH(D): CPT

## 2020-08-28 PROCEDURE — 86850 RBC ANTIBODY SCREEN: CPT

## 2020-08-28 PROCEDURE — 85027 COMPLETE CBC AUTOMATED: CPT

## 2020-08-28 PROCEDURE — 80053 COMPREHEN METABOLIC PANEL: CPT

## 2020-08-28 PROCEDURE — G0463: CPT

## 2020-08-28 PROCEDURE — 87086 URINE CULTURE/COLONY COUNT: CPT

## 2020-08-28 RX ADMIN — Medication 600 MILLIGRAM(S): at 09:25

## 2020-08-28 RX ADMIN — Medication 975 MILLIGRAM(S): at 06:10

## 2020-08-28 RX ADMIN — PIPERACILLIN AND TAZOBACTAM 25 GRAM(S): 4; .5 INJECTION, POWDER, LYOPHILIZED, FOR SOLUTION INTRAVENOUS at 01:59

## 2020-08-28 RX ADMIN — Medication 600 MILLIGRAM(S): at 01:59

## 2020-08-28 RX ADMIN — Medication 600 MILLIGRAM(S): at 08:53

## 2020-08-28 RX ADMIN — Medication 975 MILLIGRAM(S): at 12:13

## 2020-08-28 RX ADMIN — Medication 975 MILLIGRAM(S): at 12:45

## 2020-08-28 RX ADMIN — SODIUM CHLORIDE 3 MILLILITER(S): 9 INJECTION INTRAMUSCULAR; INTRAVENOUS; SUBCUTANEOUS at 06:09

## 2020-08-28 RX ADMIN — Medication 1 TABLET(S): at 12:14

## 2020-08-28 NOTE — PROGRESS NOTE ADULT - SUBJECTIVE AND OBJECTIVE BOX
Patient seen and examined at bedside, no acute overnight events. Pt c/o feeling bloated. Pain well controlled. Patient is ambulating, voiding spontaneously, passing gas, and tolerating regular diet. Denies CP, SOB, N/V, HA, blurred vision, epigastric pain.    Vital Signs Last 24 Hours  T(C): 36.8 (08-28-20 @ 05:33), Max: 36.8 (08-28-20 @ 05:33)  HR: 65 (08-28-20 @ 05:33) (65 - 88)  BP: 123/83 (08-28-20 @ 05:33) (110/71 - 123/83)  RR: 17 (08-28-20 @ 05:33) (17 - 18)  SpO2: 98% (08-28-20 @ 05:33) (97% - 99%)    Physical Exam:  General: NAD  Abdomen: Soft, non-tender, non-distended, fundus firm  Pelvic: Lochia wnl    Labs:    Blood Type: A Positive  Antibody Screen: Negative  RPR: Negative               9.1    13.42 )-----------( 179      ( 08-26 @ 22:46 )             28.5                10.1   16.84 )-----------( 188      ( 08-26 @ 17:55 )             31.7                10.0   17.31 )-----------( 183      ( 08-26 @ 13:23 )             30.4         MEDICATIONS  (STANDING):  acetaminophen   Tablet .. 975 milliGRAM(s) Oral <User Schedule>  diphtheria/tetanus/pertussis (acellular) Vaccine (ADAcel) 0.5 milliLiter(s) IntraMuscular once  ibuprofen  Tablet. 600 milliGRAM(s) Oral every 6 hours  lactated ringers. 1000 milliLiter(s) (125 mL/Hr) IV Continuous <Continuous>  oxytocin Infusion 333.333 milliUNIT(s)/Min (1000 mL/Hr) IV Continuous <Continuous>  oxytocin Infusion 333.333 milliUNIT(s)/Min (1000 mL/Hr) IV Continuous <Continuous>  oxytocin Infusion 4 milliUNIT(s)/Min (4 mL/Hr) IV Continuous <Continuous>  oxytocin Infusion 4 milliUNIT(s)/Min (4 mL/Hr) IV Continuous <Continuous>  piperacillin/tazobactam IVPB.. 3.375 Gram(s) IV Intermittent every 8 hours  prenatal multivitamin 1 Tablet(s) Oral daily  sodium chloride 0.9% lock flush 3 milliLiter(s) IV Push every 8 hours    MEDICATIONS  (PRN):  benzocaine 20%/menthol 0.5% Spray 1 Spray(s) Topical every 6 hours PRN for Perineal discomfort  dibucaine 1% Ointment 1 Application(s) Topical every 6 hours PRN Perineal discomfort  diphenhydrAMINE 25 milliGRAM(s) Oral every 6 hours PRN Pruritus  hydrocortisone 1% Cream 1 Application(s) Topical every 6 hours PRN Moderate Pain (4-6)  lanolin Ointment 1 Application(s) Topical every 6 hours PRN nipple soreness  magnesium hydroxide Suspension 30 milliLiter(s) Oral two times a day PRN Constipation  oxyCODONE    IR 5 milliGRAM(s) Oral every 3 hours PRN Moderate to Severe Pain (4-10)  oxyCODONE    IR 5 milliGRAM(s) Oral once PRN Moderate to Severe Pain (4-10)  pramoxine 1%/zinc 5% Cream 1 Application(s) Topical every 4 hours PRN Moderate Pain (4-6)  simethicone 80 milliGRAM(s) Chew every 4 hours PRN Gas  witch hazel Pads 1 Application(s) Topical every 4 hours PRN Perineal discomfort
Patient seen and examined at bedside, no acute overnight events. Pt complains of nausea. Pain well controlled. Patient is ambulating, voiding spontaneously, passing gas, and tolerating regular diet. Denies CP, SOB, HA, blurred vision, epigastric pain.    Vital Signs Last 24 Hours  T(C): 36.7 (08-27-20 @ 04:49), Max: 37.6 (08-26-20 @ 12:55)  HR: 94 (08-27-20 @ 04:49) (68 - 136)  BP: 126/82 (08-27-20 @ 04:49) (102/55 - 131/63)  RR: 18 (08-27-20 @ 04:49) (18 - 20)  SpO2: 100% (08-27-20 @ 04:49) (96% - 100%)    Physical Exam:  General: NAD  Abdomen: Soft, non-tender, non-distended, fundus firm, mildly deviated to the right  Pelvic: Lochia wnl    Labs:    Blood Type: A Positive  Antibody Screen: Negative  RPR: Negative               9.1    13.42 )-----------( 179      ( 08-26 @ 22:46 )             28.5                10.1   16.84 )-----------( 188      ( 08-26 @ 17:55 )             31.7                10.0   17.31 )-----------( 183      ( 08-26 @ 13:23 )             30.4         MEDICATIONS  (STANDING):  acetaminophen   Tablet .. 975 milliGRAM(s) Oral <User Schedule>  diphtheria/tetanus/pertussis (acellular) Vaccine (ADAcel) 0.5 milliLiter(s) IntraMuscular once  ibuprofen  Tablet. 600 milliGRAM(s) Oral every 6 hours  lactated ringers. 1000 milliLiter(s) (125 mL/Hr) IV Continuous <Continuous>  oxytocin Infusion 333.333 milliUNIT(s)/Min (1000 mL/Hr) IV Continuous <Continuous>  oxytocin Infusion 333.333 milliUNIT(s)/Min (1000 mL/Hr) IV Continuous <Continuous>  oxytocin Infusion 4 milliUNIT(s)/Min (4 mL/Hr) IV Continuous <Continuous>  oxytocin Infusion 4 milliUNIT(s)/Min (4 mL/Hr) IV Continuous <Continuous>  piperacillin/tazobactam IVPB.. 3.375 Gram(s) IV Intermittent every 6 hours  prenatal multivitamin 1 Tablet(s) Oral daily  sodium chloride 0.9% lock flush 3 milliLiter(s) IV Push every 8 hours    MEDICATIONS  (PRN):  benzocaine 20%/menthol 0.5% Spray 1 Spray(s) Topical every 6 hours PRN for Perineal discomfort  dibucaine 1% Ointment 1 Application(s) Topical every 6 hours PRN Perineal discomfort  diphenhydrAMINE 25 milliGRAM(s) Oral every 6 hours PRN Pruritus  hydrocortisone 1% Cream 1 Application(s) Topical every 6 hours PRN Moderate Pain (4-6)  lanolin Ointment 1 Application(s) Topical every 6 hours PRN nipple soreness  magnesium hydroxide Suspension 30 milliLiter(s) Oral two times a day PRN Constipation  oxyCODONE    IR 5 milliGRAM(s) Oral every 3 hours PRN Moderate to Severe Pain (4-10)  oxyCODONE    IR 5 milliGRAM(s) Oral once PRN Moderate to Severe Pain (4-10)  pramoxine 1%/zinc 5% Cream 1 Application(s) Topical every 4 hours PRN Moderate Pain (4-6)  simethicone 80 milliGRAM(s) Chew every 4 hours PRN Gas  witch hazel Pads 1 Application(s) Topical every 4 hours PRN Perineal discomfort

## 2020-08-28 NOTE — PROGRESS NOTE ADULT - ASSESSMENT
Pt is a 23yo  PPD1 from  in stable condition.
Pt is a 23yo PPD1 from  c/b postpartum tachycardia now resolved.

## 2020-08-28 NOTE — PROGRESS NOTE ADULT - PROBLEM SELECTOR PLAN 1
- F/u placenta, blood, & urine cultures  - Continue with po analgesia  - Increase ambulation  - Continue regular diet  - IV lock  - No labs    Cezar Nassar, PGY1
- Continue with po analgesia  - Increase ambulation  - Continue regular diet  - IV lock  - No labs    Cezar Nassar, PGY1

## 2020-08-31 LAB
CULTURE RESULTS: SIGNIFICANT CHANGE UP
CULTURE RESULTS: SIGNIFICANT CHANGE UP
SPECIMEN SOURCE: SIGNIFICANT CHANGE UP
SPECIMEN SOURCE: SIGNIFICANT CHANGE UP

## 2020-09-08 ENCOUNTER — EMERGENCY (EMERGENCY)
Facility: HOSPITAL | Age: 25
LOS: 1 days | Discharge: HOME | End: 2020-09-08
Attending: EMERGENCY MEDICINE | Admitting: EMERGENCY MEDICINE
Payer: COMMERCIAL

## 2020-09-08 VITALS
OXYGEN SATURATION: 95 % | SYSTOLIC BLOOD PRESSURE: 120 MMHG | WEIGHT: 119.93 LBS | RESPIRATION RATE: 18 BRPM | HEART RATE: 102 BPM | HEIGHT: 63 IN | TEMPERATURE: 99 F | DIASTOLIC BLOOD PRESSURE: 83 MMHG

## 2020-09-08 VITALS
DIASTOLIC BLOOD PRESSURE: 75 MMHG | SYSTOLIC BLOOD PRESSURE: 118 MMHG | TEMPERATURE: 98 F | RESPIRATION RATE: 18 BRPM | HEART RATE: 88 BPM | OXYGEN SATURATION: 98 %

## 2020-09-08 LAB
ALBUMIN SERPL ELPH-MCNC: 4.3 G/DL — SIGNIFICANT CHANGE UP (ref 3.3–5)
ALP SERPL-CCNC: 81 U/L — SIGNIFICANT CHANGE UP (ref 40–120)
ALT FLD-CCNC: 12 U/L — SIGNIFICANT CHANGE UP (ref 10–45)
ANION GAP SERPL CALC-SCNC: 12 MMOL/L — SIGNIFICANT CHANGE UP (ref 5–17)
APPEARANCE UR: CLEAR — SIGNIFICANT CHANGE UP
APTT BLD: 33.1 SEC — SIGNIFICANT CHANGE UP (ref 27.5–35.5)
AST SERPL-CCNC: 17 U/L — SIGNIFICANT CHANGE UP (ref 10–40)
BASOPHILS # BLD AUTO: 0.05 K/UL — SIGNIFICANT CHANGE UP (ref 0–0.2)
BASOPHILS NFR BLD AUTO: 0.5 % — SIGNIFICANT CHANGE UP (ref 0–2)
BILIRUB SERPL-MCNC: 0.2 MG/DL — SIGNIFICANT CHANGE UP (ref 0.2–1.2)
BILIRUB UR-MCNC: NEGATIVE — SIGNIFICANT CHANGE UP
BUN SERPL-MCNC: 11 MG/DL — SIGNIFICANT CHANGE UP (ref 7–23)
CALCIUM SERPL-MCNC: 10.1 MG/DL — SIGNIFICANT CHANGE UP (ref 8.4–10.5)
CHLORIDE SERPL-SCNC: 104 MMOL/L — SIGNIFICANT CHANGE UP (ref 96–108)
CO2 SERPL-SCNC: 25 MMOL/L — SIGNIFICANT CHANGE UP (ref 22–31)
COLOR SPEC: YELLOW — SIGNIFICANT CHANGE UP
CREAT SERPL-MCNC: 0.68 MG/DL — SIGNIFICANT CHANGE UP (ref 0.5–1.3)
DIFF PNL FLD: ABNORMAL
EOSINOPHIL # BLD AUTO: 0.33 K/UL — SIGNIFICANT CHANGE UP (ref 0–0.5)
EOSINOPHIL NFR BLD AUTO: 3.5 % — SIGNIFICANT CHANGE UP (ref 0–6)
GLUCOSE SERPL-MCNC: 102 MG/DL — HIGH (ref 70–99)
GLUCOSE UR QL: NEGATIVE — SIGNIFICANT CHANGE UP
HCT VFR BLD CALC: 40.8 % — SIGNIFICANT CHANGE UP (ref 34.5–45)
HGB BLD-MCNC: 12.9 G/DL — SIGNIFICANT CHANGE UP (ref 11.5–15.5)
IMM GRANULOCYTES NFR BLD AUTO: 0.9 % — SIGNIFICANT CHANGE UP (ref 0–1.5)
INR BLD: 0.97 — SIGNIFICANT CHANGE UP (ref 0.88–1.16)
KETONES UR-MCNC: NEGATIVE — SIGNIFICANT CHANGE UP
LEUKOCYTE ESTERASE UR-ACNC: ABNORMAL
LYMPHOCYTES # BLD AUTO: 3.13 K/UL — SIGNIFICANT CHANGE UP (ref 1–3.3)
LYMPHOCYTES # BLD AUTO: 32.8 % — SIGNIFICANT CHANGE UP (ref 13–44)
MCHC RBC-ENTMCNC: 29.2 PG — SIGNIFICANT CHANGE UP (ref 27–34)
MCHC RBC-ENTMCNC: 31.6 GM/DL — LOW (ref 32–36)
MCV RBC AUTO: 92.3 FL — SIGNIFICANT CHANGE UP (ref 80–100)
MONOCYTES # BLD AUTO: 0.38 K/UL — SIGNIFICANT CHANGE UP (ref 0–0.9)
MONOCYTES NFR BLD AUTO: 4 % — SIGNIFICANT CHANGE UP (ref 2–14)
NEUTROPHILS # BLD AUTO: 5.57 K/UL — SIGNIFICANT CHANGE UP (ref 1.8–7.4)
NEUTROPHILS NFR BLD AUTO: 58.3 % — SIGNIFICANT CHANGE UP (ref 43–77)
NITRITE UR-MCNC: NEGATIVE — SIGNIFICANT CHANGE UP
NRBC # BLD: 0 /100 WBCS — SIGNIFICANT CHANGE UP (ref 0–0)
PH UR: 7 — SIGNIFICANT CHANGE UP (ref 5–8)
PLATELET # BLD AUTO: 508 K/UL — HIGH (ref 150–400)
POTASSIUM SERPL-MCNC: 4.3 MMOL/L — SIGNIFICANT CHANGE UP (ref 3.5–5.3)
POTASSIUM SERPL-SCNC: 4.3 MMOL/L — SIGNIFICANT CHANGE UP (ref 3.5–5.3)
PROT SERPL-MCNC: 8 G/DL — SIGNIFICANT CHANGE UP (ref 6–8.3)
PROT UR-MCNC: NEGATIVE MG/DL — SIGNIFICANT CHANGE UP
PROTHROM AB SERPL-ACNC: 11.7 SEC — SIGNIFICANT CHANGE UP (ref 10.6–13.6)
RBC # BLD: 4.42 M/UL — SIGNIFICANT CHANGE UP (ref 3.8–5.2)
RBC # FLD: 12.9 % — SIGNIFICANT CHANGE UP (ref 10.3–14.5)
SODIUM SERPL-SCNC: 141 MMOL/L — SIGNIFICANT CHANGE UP (ref 135–145)
SP GR SPEC: <=1.005 — SIGNIFICANT CHANGE UP (ref 1–1.03)
UROBILINOGEN FLD QL: 0.2 E.U./DL — SIGNIFICANT CHANGE UP
WBC # BLD: 9.55 K/UL — SIGNIFICANT CHANGE UP (ref 3.8–10.5)
WBC # FLD AUTO: 9.55 K/UL — SIGNIFICANT CHANGE UP (ref 3.8–10.5)

## 2020-09-08 PROCEDURE — 85610 PROTHROMBIN TIME: CPT

## 2020-09-08 PROCEDURE — 36415 COLL VENOUS BLD VENIPUNCTURE: CPT

## 2020-09-08 PROCEDURE — 80053 COMPREHEN METABOLIC PANEL: CPT

## 2020-09-08 PROCEDURE — 85025 COMPLETE CBC W/AUTO DIFF WBC: CPT

## 2020-09-08 PROCEDURE — 99283 EMERGENCY DEPT VISIT LOW MDM: CPT

## 2020-09-08 PROCEDURE — 85730 THROMBOPLASTIN TIME PARTIAL: CPT

## 2020-09-08 PROCEDURE — 81001 URINALYSIS AUTO W/SCOPE: CPT

## 2020-09-08 PROCEDURE — 87086 URINE CULTURE/COLONY COUNT: CPT

## 2020-09-08 PROCEDURE — 99284 EMERGENCY DEPT VISIT MOD MDM: CPT

## 2020-09-08 NOTE — ED PROVIDER NOTE - CLINICAL SUMMARY MEDICAL DECISION MAKING FREE TEXT BOX
23 y/o F presents w/ crusting over nipples. No longer breast feeding. Currently on Augmentin, WBC normal. Will obtain gyn consult for further recommendations.

## 2020-09-08 NOTE — CONSULT NOTE ADULT - ASSESSMENT
25yo  PPD12 with bilateral nipple crusting, most likely impetigo. No evidence of systemic infection. Vitals stable, CBC WNL.   - Complete course of Augmentin  - Follow up with OB, Dr. Mendoza, within the week  - Continue to use breast pump until follow up visit  - Return for fevers greater than 100.4, severe breast pain    Plan discussed with Dr. Sifuentes

## 2020-09-08 NOTE — ED PROVIDER NOTE - PATIENT PORTAL LINK FT
You can access the FollowMyHealth Patient Portal offered by [facility] by registering at the following website: [email or web address]. By joining [employer]’s FollowMyHealth portal, you will also be able to view your health information using other applications (apps) compatible with our system.

## 2020-09-08 NOTE — ED PROVIDER NOTE - OBJECTIVE STATEMENT
25 y/o otherwise healthy F s/p normal spontaneous vaginal delivery x12 days ago now presents to the ED c/o b/l crusting and pain to nipples w/ some associated white drainage to breasts. Was referred to ED after showing pictures to gyn. Has been on Augmentin for past wk w/ improving chills and breast pain. No longer breast feeding is pumping. No other complaints.

## 2020-09-08 NOTE — ED PROVIDER NOTE - PHYSICAL EXAMINATION
CONSTITUTIONAL: Awake, alert and in no apparent distress.  HEENT: Head is atraumatic. Eyes clear bilaterally, normal EOMI. Airway patent.  CARDIAC: Normal rate, regular rhythm.  Heart sounds S1, S2.   RESPIRATORY: Breath sounds clear and equal bilaterally. no tachypnea, respiratory distress.   GASTROINTESTINAL: Abdomen soft, non-tender, no guarding, distension.  MUSCULOSKELETAL: Spine appears normal, no midline spinal tenderness, range of motion is not limited, no muscle or joint tenderness. no bony tenderness. no tenderness or redness to breasts, no fluctuance, yellow crusting to nipples b/l.   NEUROLOGICAL: Alert, no focal deficits, no motor or sensory deficits.  SKIN: Skin normal color for race, warm, dry and intact. No evidence of rash.  PSYCHIATRIC: Normal mood and affect.

## 2020-09-08 NOTE — CONSULT NOTE ADULT - SUBJECTIVE AND OBJECTIVE BOX
23yo  PPD 12 after  at [facility] c/b chorio, requiring two days of IV abx. Pt states that during the past week she has noticed bilateral nipple skin crusting with some white thick discharge. Pt initially noticed subjective fevers (99.4 at home) and chills. Pt called her OB, Dr. Mendoza, who prescribed her a 10 day course of Augmentin. Pt states that since then the subjective fevers and chills have resolved and the discharge has improved. Pt has also stopped breastfeeding and has been pumping. Patient sent a picture of her nipples to Dr. Mendoza, who sent her to be seen here today. Pt also took two doses of diflucan for mild vaginal itching. Pt denies that the baby has any oral sxs.     OB Hx:  G1: , term, c/b chorio   GYN: no hx abnormal pap smears, STIs  PMH: denies  Meds: denies  PSH: denies  Allergies: NKDA    PHYSICAL EXAM:   Vital Signs Last 24 Hrs  T(C): 37.3 (08 Sep 2020 16:28), Max: 37.3 (08 Sep 2020 16:28)  T(F): 99.2 (08 Sep 2020 16:28), Max: 99.2 (08 Sep 2020 16:28)  HR: 102 (08 Sep 2020 16:28) (102 - 102)  BP: 120/83 (08 Sep 2020 16:28) (120/83 - 120/83)  BP(mean): --  RR: 18 (08 Sep 2020 16:28) (18 - 18)  SpO2: 95% (08 Sep 2020 16:28) (95% - 95%)    **************************  Constitutional: Alert & Oriented x3, No acute distresses  Gastrointestinal: soft, non tender, no rebound or guarding   Breast: bilateral nipples with yellowish crusting, could not express any discharge. Nontender to palpation, no erythema, warmth, masses.   Pelvic exam: deferred   Extremities: no calf tenderness or swelling    LABS:                        12.9   9.55  )-----------( 508      ( 08 Sep 2020 16:44 )             40.8     09-08    141  |  104  |  11  ----------------------------<  102<H>  4.3   |  25  |  0.68    Ca    10.1      08 Sep 2020 16:44    TPro  8.0  /  Alb  4.3  /  TBili  0.2  /  DBili  x   /  AST  17  /  ALT  12  /  AlkPhos  81  09-08    PT/INR - ( 08 Sep 2020 16:44 )   PT: 11.7 sec;   INR: 0.97          PTT - ( 08 Sep 2020 16:44 )  PTT:33.1 sec  Urinalysis Basic - ( 08 Sep 2020 17:41 )    Color: Yellow / Appearance: Clear / SG: <=1.005 / pH: x  Gluc: x / Ketone: NEGATIVE  / Bili: Negative / Urobili: 0.2 E.U./dL   Blood: x / Protein: NEGATIVE mg/dL / Nitrite: NEGATIVE   Leuk Esterase: Moderate / RBC: 5-10 /HPF / WBC > 10 /HPF   Sq Epi: x / Non Sq Epi: Many /HPF / Bacteria: Present /HPF

## 2020-09-09 LAB
CULTURE RESULTS: SIGNIFICANT CHANGE UP
SPECIMEN SOURCE: SIGNIFICANT CHANGE UP
SURGICAL PATHOLOGY STUDY: SIGNIFICANT CHANGE UP

## 2020-09-14 DIAGNOSIS — N64.4 MASTODYNIA: ICD-10-CM

## 2020-09-14 DIAGNOSIS — L01.00 IMPETIGO, UNSPECIFIED: ICD-10-CM

## 2021-09-09 ENCOUNTER — ASOB RESULT (OUTPATIENT)
Age: 26
End: 2021-09-09

## 2021-09-09 ENCOUNTER — APPOINTMENT (OUTPATIENT)
Dept: ANTEPARTUM | Facility: CLINIC | Age: 26
End: 2021-09-09

## 2021-09-09 ENCOUNTER — APPOINTMENT (OUTPATIENT)
Dept: ANTEPARTUM | Facility: CLINIC | Age: 26
End: 2021-09-09
Payer: COMMERCIAL

## 2021-09-09 PROBLEM — Z78.9 OTHER SPECIFIED HEALTH STATUS: Chronic | Status: ACTIVE | Noted: 2020-09-08

## 2021-09-09 PROCEDURE — 76818 FETAL BIOPHYS PROFILE W/NST: CPT

## 2021-09-09 PROCEDURE — 76816 OB US FOLLOW-UP PER FETUS: CPT

## 2021-09-14 ENCOUNTER — APPOINTMENT (OUTPATIENT)
Dept: ANTEPARTUM | Facility: CLINIC | Age: 26
End: 2021-09-14
Payer: COMMERCIAL

## 2021-09-14 ENCOUNTER — ASOB RESULT (OUTPATIENT)
Age: 26
End: 2021-09-14

## 2021-09-14 PROCEDURE — 76818 FETAL BIOPHYS PROFILE W/NST: CPT

## 2021-09-23 ENCOUNTER — APPOINTMENT (OUTPATIENT)
Dept: ANTEPARTUM | Facility: CLINIC | Age: 26
End: 2021-09-23
Payer: COMMERCIAL

## 2021-09-23 ENCOUNTER — ASOB RESULT (OUTPATIENT)
Age: 26
End: 2021-09-23

## 2021-09-23 PROCEDURE — 76818 FETAL BIOPHYS PROFILE W/NST: CPT

## 2021-09-30 ENCOUNTER — APPOINTMENT (OUTPATIENT)
Dept: ANTEPARTUM | Facility: CLINIC | Age: 26
End: 2021-09-30

## 2021-10-01 ENCOUNTER — ASOB RESULT (OUTPATIENT)
Age: 26
End: 2021-10-01

## 2021-10-01 ENCOUNTER — APPOINTMENT (OUTPATIENT)
Dept: ANTEPARTUM | Facility: CLINIC | Age: 26
End: 2021-10-01
Payer: COMMERCIAL

## 2021-10-01 ENCOUNTER — APPOINTMENT (OUTPATIENT)
Dept: ANTEPARTUM | Facility: CLINIC | Age: 26
End: 2021-10-01

## 2021-10-01 PROCEDURE — 76816 OB US FOLLOW-UP PER FETUS: CPT

## 2021-10-01 PROCEDURE — 76818 FETAL BIOPHYS PROFILE W/NST: CPT

## 2021-10-02 ENCOUNTER — INPATIENT (INPATIENT)
Facility: HOSPITAL | Age: 26
LOS: 1 days | Discharge: HOME | End: 2021-10-04
Attending: OBSTETRICS & GYNECOLOGY | Admitting: OBSTETRICS & GYNECOLOGY
Payer: COMMERCIAL

## 2021-10-02 VITALS — OXYGEN SATURATION: 99 % | HEART RATE: 93 BPM

## 2021-10-02 DIAGNOSIS — Z3A.00 WEEKS OF GESTATION OF PREGNANCY NOT SPECIFIED: ICD-10-CM

## 2021-10-02 DIAGNOSIS — O26.899 OTHER SPECIFIED PREGNANCY RELATED CONDITIONS, UNSPECIFIED TRIMESTER: ICD-10-CM

## 2021-10-02 DIAGNOSIS — Z90.89 ACQUIRED ABSENCE OF OTHER ORGANS: Chronic | ICD-10-CM

## 2021-10-02 DIAGNOSIS — O47.1 FALSE LABOR AT OR AFTER 37 COMPLETED WEEKS OF GESTATION: ICD-10-CM

## 2021-10-02 LAB
BASOPHILS # BLD AUTO: 0.02 K/UL — SIGNIFICANT CHANGE UP (ref 0–0.2)
BASOPHILS NFR BLD AUTO: 0.2 % — SIGNIFICANT CHANGE UP (ref 0–2)
BLD GP AB SCN SERPL QL: NEGATIVE — SIGNIFICANT CHANGE UP
EOSINOPHIL # BLD AUTO: 0.1 K/UL — SIGNIFICANT CHANGE UP (ref 0–0.5)
EOSINOPHIL NFR BLD AUTO: 1 % — SIGNIFICANT CHANGE UP (ref 0–6)
HCT VFR BLD CALC: 37 % — SIGNIFICANT CHANGE UP (ref 34.5–45)
HGB BLD-MCNC: 12 G/DL — SIGNIFICANT CHANGE UP (ref 11.5–15.5)
IMM GRANULOCYTES NFR BLD AUTO: 0.4 % — SIGNIFICANT CHANGE UP (ref 0–1.5)
LYMPHOCYTES # BLD AUTO: 2.29 K/UL — SIGNIFICANT CHANGE UP (ref 1–3.3)
LYMPHOCYTES # BLD AUTO: 23.6 % — SIGNIFICANT CHANGE UP (ref 13–44)
MCHC RBC-ENTMCNC: 29.7 PG — SIGNIFICANT CHANGE UP (ref 27–34)
MCHC RBC-ENTMCNC: 32.4 GM/DL — SIGNIFICANT CHANGE UP (ref 32–36)
MCV RBC AUTO: 91.6 FL — SIGNIFICANT CHANGE UP (ref 80–100)
MONOCYTES # BLD AUTO: 0.44 K/UL — SIGNIFICANT CHANGE UP (ref 0–0.9)
MONOCYTES NFR BLD AUTO: 4.5 % — SIGNIFICANT CHANGE UP (ref 2–14)
NEUTROPHILS # BLD AUTO: 6.83 K/UL — SIGNIFICANT CHANGE UP (ref 1.8–7.4)
NEUTROPHILS NFR BLD AUTO: 70.3 % — SIGNIFICANT CHANGE UP (ref 43–77)
NRBC # BLD: 0 /100 WBCS — SIGNIFICANT CHANGE UP (ref 0–0)
PLATELET # BLD AUTO: 207 K/UL — SIGNIFICANT CHANGE UP (ref 150–400)
RBC # BLD: 4.04 M/UL — SIGNIFICANT CHANGE UP (ref 3.8–5.2)
RBC # FLD: 13.3 % — SIGNIFICANT CHANGE UP (ref 10.3–14.5)
RH IG SCN BLD-IMP: POSITIVE — SIGNIFICANT CHANGE UP
SARS-COV-2 RNA SPEC QL NAA+PROBE: SIGNIFICANT CHANGE UP
WBC # BLD: 9.72 K/UL — SIGNIFICANT CHANGE UP (ref 3.8–10.5)
WBC # FLD AUTO: 9.72 K/UL — SIGNIFICANT CHANGE UP (ref 3.8–10.5)

## 2021-10-02 RX ORDER — OXYTOCIN 10 UNIT/ML
333.33 VIAL (ML) INJECTION
Qty: 20 | Refills: 0 | Status: DISCONTINUED | OUTPATIENT
Start: 2021-10-02 | End: 2021-10-04

## 2021-10-02 RX ORDER — OXYTOCIN 10 UNIT/ML
4 VIAL (ML) INJECTION
Qty: 30 | Refills: 0 | Status: DISCONTINUED | OUTPATIENT
Start: 2021-10-02 | End: 2021-10-04

## 2021-10-02 RX ORDER — SODIUM CHLORIDE 9 MG/ML
1000 INJECTION, SOLUTION INTRAVENOUS
Refills: 0 | Status: DISCONTINUED | OUTPATIENT
Start: 2021-10-02 | End: 2021-10-03

## 2021-10-02 RX ORDER — CITRIC ACID/SODIUM CITRATE 300-500 MG
15 SOLUTION, ORAL ORAL EVERY 6 HOURS
Refills: 0 | Status: DISCONTINUED | OUTPATIENT
Start: 2021-10-02 | End: 2021-10-03

## 2021-10-02 RX ADMIN — Medication 4 MILLIUNIT(S)/MIN: at 23:03

## 2021-10-02 NOTE — OB PROVIDER H&P - NSHPPHYSICALEXAM_GEN_ALL_CORE
Objective  Vital signs  VS  T(C): 37 (10-02-21 @ 22:21)  HR: 93 (10-02-21 @ 23:00)  BP: 125/62 (10-02-21 @ 22:21)  RR: 16 (10-02-21 @ 22:21)  SpO2: 99% (10-02-21 @ 23:00)    – PE:   CV: RRR  Pulm: breathing comfortably on RA  Abd: gravid, nontender  Extr: moving all extremities with ease  – VE: 3/60/-3 (By MD Hi)  – FHT: baseline 145, mod variability, +accels, -decels  – [place]: irregular  – EFW: 3800g by sono  – Sono: vertex (By MD Hi)

## 2021-10-02 NOTE — OB PROVIDER H&P - ASSESSMENT
Assessment  26y  39w5d admitted for IOL for poly.      Plan  Admit to LND. Routine Labs. IVF.  Pitocin 4x4   Fetus: cat 1 tracing. Continuous EFM.   Prenatal issues: poly, 2U hold   GBS neg  COVID pending     Patient discussed with attending physician, Dr. Hi.      Jonelle Duenas MD PGY2

## 2021-10-02 NOTE — OB PROVIDER H&P - HISTORY OF PRESENT ILLNESS
R1 Admission H&P    Subjective  HPI: 26yoF  at 39w5d gestational age presents for IOL for polyhydramnios, DANIEL 32. +FM. -LOF. -CTXs. -VB. Pt denies any other concerns.    – PNC: Poly. GBS neg.  EFW 3800g   – OBHx:  x1 7#11 , FT uncomp  – GynHx: denies  – PMH: denies  – PSH: Tonsillectomy  – Psych: denies   – Social: denies   – Meds: PNV   – Allergies: NKDA  – Will accept blood transfusions? Yes

## 2021-10-03 LAB
COVID-19 SPIKE DOMAIN AB INTERP: POSITIVE
COVID-19 SPIKE DOMAIN ANTIBODY RESULT: >250 U/ML — HIGH
SARS-COV-2 IGG+IGM SERPL QL IA: >250 U/ML — HIGH
SARS-COV-2 IGG+IGM SERPL QL IA: POSITIVE
T PALLIDUM AB TITR SER: NEGATIVE — SIGNIFICANT CHANGE UP

## 2021-10-03 RX ORDER — SIMETHICONE 80 MG/1
80 TABLET, CHEWABLE ORAL EVERY 4 HOURS
Refills: 0 | Status: DISCONTINUED | OUTPATIENT
Start: 2021-10-03 | End: 2021-10-04

## 2021-10-03 RX ORDER — IBUPROFEN 200 MG
600 TABLET ORAL EVERY 6 HOURS
Refills: 0 | Status: COMPLETED | OUTPATIENT
Start: 2021-10-03 | End: 2022-09-01

## 2021-10-03 RX ORDER — OXYTOCIN 10 UNIT/ML
333.33 VIAL (ML) INJECTION
Qty: 20 | Refills: 0 | Status: DISCONTINUED | OUTPATIENT
Start: 2021-10-03 | End: 2021-10-04

## 2021-10-03 RX ORDER — PRAMOXINE HYDROCHLORIDE 150 MG/15G
1 AEROSOL, FOAM RECTAL EVERY 4 HOURS
Refills: 0 | Status: DISCONTINUED | OUTPATIENT
Start: 2021-10-03 | End: 2021-10-04

## 2021-10-03 RX ORDER — ACETAMINOPHEN 500 MG
975 TABLET ORAL
Refills: 0 | Status: DISCONTINUED | OUTPATIENT
Start: 2021-10-03 | End: 2021-10-04

## 2021-10-03 RX ORDER — BENZOCAINE 10 %
1 GEL (GRAM) MUCOUS MEMBRANE EVERY 6 HOURS
Refills: 0 | Status: DISCONTINUED | OUTPATIENT
Start: 2021-10-03 | End: 2021-10-04

## 2021-10-03 RX ORDER — KETOROLAC TROMETHAMINE 30 MG/ML
30 SYRINGE (ML) INJECTION ONCE
Refills: 0 | Status: DISCONTINUED | OUTPATIENT
Start: 2021-10-03 | End: 2021-10-03

## 2021-10-03 RX ORDER — SODIUM CHLORIDE 9 MG/ML
3 INJECTION INTRAMUSCULAR; INTRAVENOUS; SUBCUTANEOUS EVERY 8 HOURS
Refills: 0 | Status: DISCONTINUED | OUTPATIENT
Start: 2021-10-03 | End: 2021-10-04

## 2021-10-03 RX ORDER — AER TRAVELER 0.5 G/1
1 SOLUTION RECTAL; TOPICAL EVERY 4 HOURS
Refills: 0 | Status: DISCONTINUED | OUTPATIENT
Start: 2021-10-03 | End: 2021-10-04

## 2021-10-03 RX ORDER — TETANUS TOXOID, REDUCED DIPHTHERIA TOXOID AND ACELLULAR PERTUSSIS VACCINE, ADSORBED 5; 2.5; 8; 8; 2.5 [IU]/.5ML; [IU]/.5ML; UG/.5ML; UG/.5ML; UG/.5ML
0.5 SUSPENSION INTRAMUSCULAR ONCE
Refills: 0 | Status: DISCONTINUED | OUTPATIENT
Start: 2021-10-03 | End: 2021-10-04

## 2021-10-03 RX ORDER — DIBUCAINE 1 %
1 OINTMENT (GRAM) RECTAL EVERY 6 HOURS
Refills: 0 | Status: DISCONTINUED | OUTPATIENT
Start: 2021-10-03 | End: 2021-10-04

## 2021-10-03 RX ORDER — DIPHENHYDRAMINE HCL 50 MG
25 CAPSULE ORAL EVERY 6 HOURS
Refills: 0 | Status: DISCONTINUED | OUTPATIENT
Start: 2021-10-03 | End: 2021-10-04

## 2021-10-03 RX ORDER — IBUPROFEN 200 MG
600 TABLET ORAL EVERY 6 HOURS
Refills: 0 | Status: DISCONTINUED | OUTPATIENT
Start: 2021-10-03 | End: 2021-10-04

## 2021-10-03 RX ORDER — MAGNESIUM HYDROXIDE 400 MG/1
30 TABLET, CHEWABLE ORAL
Refills: 0 | Status: DISCONTINUED | OUTPATIENT
Start: 2021-10-03 | End: 2021-10-04

## 2021-10-03 RX ORDER — HYDROCORTISONE 1 %
1 OINTMENT (GRAM) TOPICAL EVERY 6 HOURS
Refills: 0 | Status: DISCONTINUED | OUTPATIENT
Start: 2021-10-03 | End: 2021-10-04

## 2021-10-03 RX ORDER — OXYCODONE HYDROCHLORIDE 5 MG/1
5 TABLET ORAL ONCE
Refills: 0 | Status: DISCONTINUED | OUTPATIENT
Start: 2021-10-03 | End: 2021-10-04

## 2021-10-03 RX ORDER — OXYCODONE HYDROCHLORIDE 5 MG/1
5 TABLET ORAL
Refills: 0 | Status: DISCONTINUED | OUTPATIENT
Start: 2021-10-03 | End: 2021-10-04

## 2021-10-03 RX ORDER — LANOLIN
1 OINTMENT (GRAM) TOPICAL EVERY 6 HOURS
Refills: 0 | Status: DISCONTINUED | OUTPATIENT
Start: 2021-10-03 | End: 2021-10-04

## 2021-10-03 RX ADMIN — Medication 30 MILLIGRAM(S): at 06:26

## 2021-10-03 RX ADMIN — Medication 975 MILLIGRAM(S): at 11:01

## 2021-10-03 RX ADMIN — Medication 600 MILLIGRAM(S): at 18:34

## 2021-10-03 RX ADMIN — Medication 600 MILLIGRAM(S): at 12:50

## 2021-10-03 RX ADMIN — Medication 975 MILLIGRAM(S): at 22:07

## 2021-10-03 RX ADMIN — Medication 975 MILLIGRAM(S): at 17:07

## 2021-10-03 RX ADMIN — Medication 975 MILLIGRAM(S): at 16:37

## 2021-10-03 RX ADMIN — Medication 600 MILLIGRAM(S): at 12:24

## 2021-10-03 RX ADMIN — Medication 975 MILLIGRAM(S): at 22:50

## 2021-10-03 RX ADMIN — Medication 975 MILLIGRAM(S): at 11:35

## 2021-10-03 NOTE — OB RN DELIVERY SUMMARY - NS_SEPSISRSKCALC_OBGYN_ALL_OB_FT
EOS calculated successfully. EOS Risk Factor: 0.5/1000 live births ([facility] national incidence); GA=39w6d; Temp=98.6; ROM=0.75; GBS='Negative'; Antibiotics='No antibiotics or any antibiotics < 2 hrs prior to birth'

## 2021-10-03 NOTE — OB PROVIDER DELIVERY SUMMARY - NSPROVIDERDELIVERYNOTE_OBGYN_ALL_OB_FT
, pt delivered of a viable male infant apgar 9/9 weight 9-0 direct OP, placenta complete and uterus explored. small periurethral laceratuion with two small interrupted sutures. ebl 300 cc.

## 2021-10-03 NOTE — OB RN DELIVERY SUMMARY - NSSELHIDDEN_OBGYN_ALL_OB_FT
[NS_DeliveryAttending1_OBGYN_ALL_OB_FT:[email or web address]=] [NS_DeliveryAttending1_OBGYN_ALL_OB_FT:[email or web address]=],[NS_DeliveryRN_OBGYN_ALL_OB_FT:[id number]=]

## 2021-10-04 ENCOUNTER — TRANSCRIPTION ENCOUNTER (OUTPATIENT)
Age: 26
End: 2021-10-04

## 2021-10-04 VITALS
RESPIRATION RATE: 18 BRPM | OXYGEN SATURATION: 97 % | SYSTOLIC BLOOD PRESSURE: 110 MMHG | HEART RATE: 88 BPM | TEMPERATURE: 98 F | DIASTOLIC BLOOD PRESSURE: 74 MMHG

## 2021-10-04 PROCEDURE — 86769 SARS-COV-2 COVID-19 ANTIBODY: CPT

## 2021-10-04 PROCEDURE — 86780 TREPONEMA PALLIDUM: CPT

## 2021-10-04 PROCEDURE — 36415 COLL VENOUS BLD VENIPUNCTURE: CPT

## 2021-10-04 PROCEDURE — 86900 BLOOD TYPING SEROLOGIC ABO: CPT

## 2021-10-04 PROCEDURE — G0463: CPT

## 2021-10-04 PROCEDURE — 85025 COMPLETE CBC W/AUTO DIFF WBC: CPT

## 2021-10-04 PROCEDURE — 86901 BLOOD TYPING SEROLOGIC RH(D): CPT

## 2021-10-04 PROCEDURE — 59050 FETAL MONITOR W/REPORT: CPT

## 2021-10-04 PROCEDURE — 86850 RBC ANTIBODY SCREEN: CPT

## 2021-10-04 PROCEDURE — 59025 FETAL NON-STRESS TEST: CPT

## 2021-10-04 PROCEDURE — 87635 SARS-COV-2 COVID-19 AMP PRB: CPT

## 2021-10-04 RX ADMIN — Medication 600 MILLIGRAM(S): at 00:33

## 2021-10-04 RX ADMIN — Medication 975 MILLIGRAM(S): at 03:29

## 2021-10-04 RX ADMIN — Medication 600 MILLIGRAM(S): at 06:08

## 2021-10-04 RX ADMIN — Medication 600 MILLIGRAM(S): at 06:38

## 2021-10-04 RX ADMIN — Medication 600 MILLIGRAM(S): at 01:16

## 2021-10-04 RX ADMIN — Medication 975 MILLIGRAM(S): at 04:15

## 2021-10-04 RX ADMIN — Medication 975 MILLIGRAM(S): at 09:38

## 2021-10-04 RX ADMIN — Medication 975 MILLIGRAM(S): at 09:02

## 2021-10-04 NOTE — DISCHARGE NOTE OB - CARE PLAN
Principal Discharge DX:	 (normal spontaneous vaginal delivery)  Assessment and plan of treatment:	return to baseline health, regular diet, pain control, follow up with Dr Hi   1

## 2021-10-04 NOTE — DISCHARGE NOTE OB - CARE PROVIDER_API CALL
Amina Hi  OBSTETRICS AND GYNECOLOGY  [address], [address]  [place] [address]  Phone: [phone]  Fax: [phone]  Follow Up Time:

## 2021-10-04 NOTE — PROGRESS NOTE ADULT - SUBJECTIVE AND OBJECTIVE BOX
PA Postpartum Note- PPD#1    Prenatal Labs  Blood type: A Positive  Rubella IgG: IMMUne  RPR: Negative        S:Patient w/o complaints, pain is controlled.    Pt is OOB, tolerating PO, voiding. Lochia WNL.     O:  Vital Signs Last 24 Hrs  T(C): 36.9 (04 Oct 2021 05:14), Max: 36.9 (03 Oct 2021 08:55)  T(F): 98.4 (04 Oct 2021 05:14), Max: 98.4 (03 Oct 2021 08:55)  HR: 88 (04 Oct 2021 05:14) (83 - 94)  BP: 110/74 (04 Oct 2021 05:14) (98/65 - 110/74)  BP(mean): --  RR: 18 (04 Oct 2021 05:14) (17 - 18)  SpO2: 97% (04 Oct 2021 05:14) (97% - 99%)     Gen: NAD  Abdomen: Soft, nontender, non-distended, fundus firm.  Vaginal: Lochia WNL,    Ext: Neg edema, Neg calf tenderness    LABS:    Hemoglobin: 12.0 g/dL (10-02 @ 23:01)      Hematocrit: 37.0 % (10-02 @ 23:01)

## 2021-10-04 NOTE — DISCHARGE NOTE OB - PATIENT PORTAL LINK FT
You can access the FollowMyHealth Patient Portal offered by [facility] by registering at the following website: [email or web address]. By joining [employer]’s FollowMyHealth portal, you will also be able to view your health information using other applications (apps) compatible with our system.

## 2022-08-25 NOTE — PRE-ANESTHESIA EVALUATION ADULT - NSANTHDISPORD_GEN_ALL_CORE
----- Message from Deb Whatley sent at 8/25/2022 12:26 PM CDT -----  Contact: pt  Pt requesting call back RE: r/s appt on 9/15, nothing available in epic until 12/5. Please call pt would like something sooner        Confirmed contact below:  Contact Name:Tiarra Norton  Phone Number: [phone]       PACU

## 2022-09-24 NOTE — ED PROVIDER NOTE - NS ED MD DISPO DISCHARGE CCDA
pt complaining of sob, offered work up, pt took inhaler and decided to leave ED. Pt eloped.
Patient/Caregiver provided printed discharge information.

## 2022-10-06 NOTE — ED ADULT NURSE NOTE - OBJECTIVE STATEMENT
1.83 pt received into peds room A&Ox3 ambulatory appears comfortable arrives via walk in triage for eval of breast pain tenderness and redness post partum, was orginally dry cracked and bleeding thought to be due to breast feeding, has been sending pictures with her gyn doctor and taken 7/10 days of Augmentin without relief. denies abd pain n/v/d pain burning on urination

## 2023-04-04 NOTE — OB PROVIDER H&P - NS_ADMITREASON_OBGYN_ALL_OB
Doing well.  No concerns today.  28 wk labs today. TDAP done.   Discussed kick counts and fetal movement.  RTC in 2 weeks  Denies PTL sliliya, nupur, jarrell Mesa MD  4/4/2023      
AFV, FHR, BPP Abnormalities